# Patient Record
Sex: FEMALE | Race: WHITE | NOT HISPANIC OR LATINO | URBAN - METROPOLITAN AREA
[De-identification: names, ages, dates, MRNs, and addresses within clinical notes are randomized per-mention and may not be internally consistent; named-entity substitution may affect disease eponyms.]

---

## 2019-03-19 ENCOUNTER — APPOINTMENT (RX ONLY)
Dept: URBAN - METROPOLITAN AREA CLINIC 68 | Facility: CLINIC | Age: 19
Setting detail: DERMATOLOGY
End: 2019-03-19

## 2019-03-19 DIAGNOSIS — Z41.9 ENCOUNTER FOR PROCEDURE FOR PURPOSES OTHER THAN REMEDYING HEALTH STATE, UNSPECIFIED: ICD-10-CM

## 2019-03-19 PROBLEM — J45.909 UNSPECIFIED ASTHMA, UNCOMPLICATED: Status: ACTIVE | Noted: 2019-03-19

## 2019-03-19 PROCEDURE — ? COSMETIC CONSULTATION - KYBELLA

## 2019-03-19 ASSESSMENT — LOCATION ZONE DERM: LOCATION ZONE: FACE

## 2019-03-19 ASSESSMENT — LOCATION DETAILED DESCRIPTION DERM: LOCATION DETAILED: RIGHT MEDIAL SUBMANDIBULAR CHEEK

## 2019-03-19 ASSESSMENT — LOCATION SIMPLE DESCRIPTION DERM: LOCATION SIMPLE: RIGHT CHEEK

## 2019-03-19 NOTE — PROCEDURE: COSMETIC CONSULTATION - KYBELLA
Detail Level: Detailed
Vials Recommended (Won't Render If 0): 1
# Of Treatments Recommended (Won't Render If 0): 2
Price (Use Numbers Only, No Special Characters Or $): [address] [facility],[address]

## 2019-06-04 ENCOUNTER — APPOINTMENT (RX ONLY)
Dept: URBAN - METROPOLITAN AREA CLINIC 68 | Facility: CLINIC | Age: 19
Setting detail: DERMATOLOGY
End: 2019-06-04

## 2019-06-04 DIAGNOSIS — Z41.9 ENCOUNTER FOR PROCEDURE FOR PURPOSES OTHER THAN REMEDYING HEALTH STATE, UNSPECIFIED: ICD-10-CM

## 2019-06-04 PROCEDURE — ? KYBELLA INJECTION

## 2019-06-04 ASSESSMENT — LOCATION SIMPLE DESCRIPTION DERM
LOCATION SIMPLE: LEFT SUBMANDIBULAR AREA
LOCATION SIMPLE: SUBMENTAL CHIN

## 2019-06-04 ASSESSMENT — LOCATION DETAILED DESCRIPTION DERM
LOCATION DETAILED: LEFT SUBMANDIBULAR AREA
LOCATION DETAILED: SUBMENTAL CHIN

## 2019-06-04 ASSESSMENT — LOCATION ZONE DERM: LOCATION ZONE: FACE

## 2019-06-04 NOTE — PROCEDURE: KYBELLA INJECTION
Price (Use Numbers Only, No Special Characters Or $): [address] [facility],[address]
Treatment Area: Submental Chin
Treatment Number (Won't Render If 0): 0
Procedure Text: The treatment areas were cleansed. Tatum Distel was administered using the parameters mentioned above.
Consent: Written consent obtained. Risks include but not limited to bruising, beading, irregular texture, ulceration, infection, allergic reaction, scar formation, incomplete augmentation, temporary nature, procedural pain.
Post-Care Instructions: Patient instructed to apply ice to reduce swelling.
Topical Anesthesia?: ice and vibration
Kybella Volume In Cc (Won't Render If 0): 2
Vials Used (Won't Render If 0 - Required If Using Inventory): 1
Lot #: [id number]
Detail Level: Detailed

## 2019-08-02 ENCOUNTER — APPOINTMENT (RX ONLY)
Dept: URBAN - METROPOLITAN AREA CLINIC 68 | Facility: CLINIC | Age: 19
Setting detail: DERMATOLOGY
End: 2019-08-02

## 2019-08-02 DIAGNOSIS — Z41.9 ENCOUNTER FOR PROCEDURE FOR PURPOSES OTHER THAN REMEDYING HEALTH STATE, UNSPECIFIED: ICD-10-CM

## 2019-08-02 PROCEDURE — ? KYBELLA INJECTION

## 2019-08-02 PROCEDURE — ? COSMETIC CONSULTATION - KYBELLA

## 2019-08-02 ASSESSMENT — LOCATION DETAILED DESCRIPTION DERM
LOCATION DETAILED: LEFT SUBMANDIBULAR AREA
LOCATION DETAILED: SUBMENTAL CHIN

## 2019-08-02 ASSESSMENT — LOCATION SIMPLE DESCRIPTION DERM
LOCATION SIMPLE: LEFT SUBMANDIBULAR AREA
LOCATION SIMPLE: SUBMENTAL CHIN

## 2019-08-02 ASSESSMENT — LOCATION ZONE DERM: LOCATION ZONE: FACE

## 2019-08-02 NOTE — PROCEDURE: COSMETIC CONSULTATION - KYBELLA
Detail Level: Detailed
Price (Use Numbers Only, No Special Characters Or $): [address] [facility],[address]
Vials Recommended (Won't Render If 0): 1
# Of Treatments Recommended (Won't Render If 0): 2

## 2019-08-02 NOTE — PROCEDURE: KYBELLA INJECTION
Post-Care Instructions: Patient instructed to apply ice to reduce swelling.
Number Of Grid Dots (Won't Render If 0): 10
Treatment Area: Submental Chin
Anesthesia Volume In Cc: 0
Topical Anesthesia?: ice and vibration
Kybella Volume In Cc (Won't Render If 0): 2
Vials Used (Won't Render If 0 - Required If Using Inventory): 1
Procedure Text: The treatment areas were cleansed. Lisset Mirella was administered using the parameters mentioned above. \\n\\nICE ONLY USED FOR TOPICAL ANESTHESIA
Consent: Written consent obtained. Risks include but not limited to bruising, beading, irregular texture, ulceration, infection, allergic reaction, scar formation, incomplete augmentation, temporary nature, procedural pain.
Detail Level: Detailed
Price (Use Numbers Only, No Special Characters Or $): [address] [facility],[address]